# Patient Record
(demographics unavailable — no encounter records)

---

## 2019-09-05 NOTE — EKG
Test Reason : 

Blood Pressure : ***/*** mmHG

Vent. Rate : 072 BPM     Atrial Rate : 072 BPM

   P-R Int : 190 ms          QRS Dur : 090 ms

    QT Int : 370 ms       P-R-T Axes : 052 001 009 degrees

   QTc Int : 405 ms

 

Sinus rhythm with Fusion complexes

Low voltage QRS

Cannot rule out Anterior infarct , age undetermined

Abnormal ECG

Confirmed by DAY CM ([id number]) on 9/5/2019 5:29:17 PM

 

Referred By:  EMERSON           Confirmed By:DAY CM

## 2019-09-18 NOTE — OP
DATE OF PROCEDURE:  09/18/2019



PRIMARY CARE PHYSICIAN:  Dr. Avery Voss.



PREOPERATIVE DIAGNOSES:  

1. A  70-year-old male with history of benign prostatic hypertrophy.

2. Fossa navicularis, meatus, mild urethral stenosis.

3. Bulbar stricture.



POSTOPERATIVE DIAGNOSES:  

1. A  70-year-old male with history of benign prostatic hypertrophy.

2. Fossa navicularis, meatus, mild urethral stenosis.

3. Bulbar stricture.



PROCEDURES PERFORMED:  

1. Cystoscopy.

2. Meatal calibration and dilatation.

3. Direct vision internal urethrotomy of bulbar stricture.

4. UroLift implant x6.

5. An 18-Turkish [place] tip Hernandez catheter placed without guidewire assist, 10 mL

insufflated. 



ANESTHESIA:  LMA.



COMPLICATIONS:  None apparent.



DISPOSITION:  To recovery room in stable condition.



SPECIMENS:  None.



INDICATIONS FOR PROCEDURE AND HISTORY:  Mr. Locke is a  70-year-old male, well

known to me with history of prior retention, penile bulbar urethral stricture, BPH,

and history of kidney stones.  He desired to proceed with UroLift implant,

concomitant treatment of his urethral stricture.  Risks and complications and

indications were reviewed with him in detail.  Risks and complications including,

but not limited to, bleeding, pain, infection, injury to adjacent organs, urosepsis,

migration of UroLift implant resulting in incrustation requiring secondary

procedure, chronic pain, injury to adjacent structures, recurrent nature of urethral

stricture disease reviewed and he desired to proceed. 



DESCRIPTION OF PROCEDURE:  After an informed consent was signed, the patient was

taken to the operating room, placed in a dorsal lithotomy position with the genital

area prepped and draped in the usual surgical sterile fashion.  Bilateral JOSEY hose

SCDs and broad-spectrum antibiotics were provided.  A 21-Turkish cystoscope was 1st

utilized.  There was some resistance at the meatus fossa.  Therefore, we used van

Buren sounds to calibrate his meatus 16-Turkish easily and we dilated to 24-Turkish

with ease.  Subsequently, the 21-Turkish scope was able to be passed.  His urethral

mucosa did demonstrate diffuse area of fibrotic changes.  There were intermittent

areas of stricture along the proximal penile bulbar urethra.  I was able to gently

negotiate the scope to the level of the prostate into the bladder.  Bilobar

hyperplasia with moderate obstruction was noted with no median bar.  UOs were away

from the bladder neck as previously staged.  At this time, we transitioned to a

21-Turkish UroLift cystoscope with a 0-degree lens.  We re-staged the prostatic

urethra and the first implant was delivered, minimum 1.5 cm distal to the bladder

neck.  We treated the left side 1st, creating an anterior channel.  Total of 6

implants were required to create a nice open anterior channel.  At the end of the

procedure, his bladder neck was wide open, with a good anterior channel.  At the

apex, there was some bulging of the apical tissue; however, this was nonobstructing.

 Therefore, we did not treat this region.  We took intraoperative pictures,

demonstrating a good anterior channel.  I re-staged his urethral stricture.  One of

the strictures were somewhat close to the sphincter.  Therefore, I did not perform a

DVIU and it appeared to be wide caliber and dilated with the scope.  There was a 2nd

annular stricture at the bulbar urethra with persistent annular morphology.

Therefore, using an [facility] DVIU cold knife, we released the stricture anteriorly.

Subsequently, I was able to pass an 18-Turkish [place] tip without guidewire assist

to the level of the bladder, 10 mL insufflated, and he tolerated the procedure well.

 He was discharged 

with an indwelling Hernandez catheter, ciprofloxacin one p.o. b.i.d. x10 days, Azo

p.r.n., Colace p.r.n., tramadol #30 one p.o. q.6 h. p.r.n. at 50 mg, VESIcare 5 mg

one p.o. daily 

for 7 days.  Appointment with me next Thursday for catheter removal, voiding trial.

He was advised to continue his BPH medications, hold aspirin until followup

appointment. 







Job ID:  [id number]

## 2019-09-19 NOTE — HP
HISTORY OF PRESENT ILLNESS:  Mr. Locke is a 70-year-old male with history of 
BPH,

history of urinary retention, PVR of 200 mL on dual medical therapy, presents 
today

for cysto UroLift.  He previously underwent workup with cystoscopy, volume 
study.

Also found to have prior history of fossa navicularis stricture, urethral 
stricture

with concomitant treatment plan. 



PAST MEDICAL HISTORY:  Includes hypertension, dyslipidemia, morbid obesity, GERD
,

arthritis, sleep apnea, liver cyst. 



PAST SURGICAL HISTORY:  Cystoscopy, total knee replacement, gastric sleeve,

cholecystectomy, hernia repair, prior cystoscopy, DVIU. 



FAMILY HISTORY:  Positive for diabetes.



SOCIAL HISTORY:  Former smoker, quit.



ALLERGIES:  TO BIAXIN AND BACTRIM.



REVIEW OF SYSTEMS:  10-point review of systems as above, otherwise 
noncontributory.



PHYSICAL EXAMINATION:

VITAL SIGNS:  Stable. 

HEENT:  Grossly unremarkable. 

HEART:  Regular rate. 

LUNGS:  Clear. 

ABDOMEN:  Morbidly obese. 

GENITOURINARY:  Partially buried penis due to morbid obesity.  NAYAN, no gross

nodules. 

EXTREMITIES:  No significant calf tenderness.



IMPRESSION AND PLAN:  Mr. Locke is a 70-year-old male with history of

hypertension, morbid obesity, BPH, history of urethral stricture.  He desires to

discontinue his BPH medications, therefore presents for cysto, DVIU, possible

urethral 

stricture dilatation, UroLift.  Informed consent obtained and in chart.  We will

proceed with surgery as planned. 







Job ID:  [id number]



MTDD

## 2020-07-14 NOTE — ULT
RENAL ULTRASOUND:

 

HISTORY: 

Followup renal cyst.

 

COMPARISON: 

6/13/2019 exam.

 

FINDINGS: 

Real-time imaging of the right and left kidneys was performed.  The right kidney measures 12 cm and t
he left kidney measures 12 cm in length.  There is an exophytic cyst involving more the inferior aspe
ct of the left kidney.  It measures 4.8 x 5.3 cm and is not felt to be substantially changed.  There 
is an echogenic area within the mid pole region of the left kidney not definitely shadowing, therefor
e I think unlikely to represent a calculus.

 

IMPRESSION: 

1.  Stable appearance of the lower pole left renal cyst.

 

2.  The bladder was almost empty at the time of this exam.

 

POS: [facility]

## 2020-07-25 NOTE — CT
CT ABDOMEN AND PELVIS PERFORMED WITH AND WITHOUT CONTRAST ENHANCEMENT:

 

HISTORY: 

Hematuria for about a week.  Surgical history of gastric sleeve and cholecystectomy.

 

COMPARISON: 

A CT examination of 11/13/2015.  

 

FINDINGS: 

The lung bases are clear of infiltrative process. 

 

The liver, spleen, and pancreas regions appear unremarkable other than fatty change to the liver.  Ga
llbladder has been removed.  Postop changes of the stomach are noted.  

 

The right and left adrenal glands and right and left kidneys are normal in size.  A left renal cyst i
s seen.  It measures 5.4 cm. There are no renal calculi identified.  There are additionally some smal
l subcentimeter hypodensities involving both kidneys.  These are too small to characterize but have a
n appearance more suggestive of small cysts.  No obstruction.  No significant periaortic or mesenteri
c adenopathy.

 

CT OF PELVIS PERFORMED WITH AND WITHOUT CONTRAST ENHANCEMENT:

A fat-containing paraumbilical hernia is seen.  No pelvic lymphadenopathy or mass.  The bladder has a
 Hernandez catheter in place.  Postop changes in the prostate region are noted.

 

There are arthritic changes of the spine.

 

IMPRESSION: 

1.  Bilateral renal cysts.

 

2.  Fatty changes of the liver.

 

3.  Hernandez catheter in place within the bladder.

 

[place] [facility]

## 2020-07-25 NOTE — HP
CHIEF COMPLAINT:  Catheter pain, hematuria.



HISTORY OF PRESENT ILLNESS:  This is a 70-year-old male with a history of enlarged

prostate and urethral stricture, status post UroLift and DVIU in September 2019.  He

was recently seen by Dr. Fernandes on July 23rd as a work-in after calling the

office reporting that he was passing blood clots in his urine.  She performed

cystoscopy with no obvious source of bleed and placed a 20-Irish 3-way catheter.

Urinalysis at that time was unremarkable.  Late yesterday, he presented to the

emergency room in [place] with his catheter not draining.  This was irrigated

and he was discharged home.  However, it again became clotted and he is having

significant discomfort, so he presented to the emergency room here in [place]

overnight.  Given that he had been to the emergency room twice within 12 hours, I

elected to have him admitted.  He has been managed with CBI since admission and

currently, his urine is light pink on slow CBI.  He reports that his discomfort has

almost completely resolved.  He currently denies suprapubic pain, flank pain,

nausea, vomiting, fevers and chills, headaches, chest pains, or difficulty

breathing. 



PAST MEDICAL HISTORY:  Kidney stones, obesity, reflux, hyperlipidemia, hypertension.



PAST SURGICAL HISTORY:  UroLift, DVIU x2, gastric sleeve, bilateral knee replacement.



SOCIAL HISTORY:  The patient smokes cigars about twice per month.  Otherwise, no

cigarette smoking, no substance abuse, lives with his wife in [place], currently

retired. 



ALLERGIES:  CLARITHROMYCIN, SULFAMETHOXAZOLE.



CURRENT MEDICATIONS:  Reviewed, no pertinent urology medications.



REVIEW OF SYSTEMS:  12-point review of systems performed, negative except as

mentioned in my HPI. 



PHYSICAL EXAMINATION:

VITAL SIGNS:  Afebrile.  Vitals are stable. 

GENERAL:  No acute distress, conversant. 

HEENT:  Head, normocephalic and atraumatic.  Extraocular movements intact.  Sclerae

nonicteric. 

NECK:  Supple.  Trachea midline. 

LUNGS:  Breathing unlabored.  Symmetric chest expansion. 

HEART:  Regular rate and rhythm. 

ABDOMEN:  Soft, nontender, nondistended. 

:  No suprapubic or flank tenderness.  Three-way 20-Irish Hernandez catheter in good

position draining light pink urine on slow CBI, small amount of blood around the

meatus. 

EXTREMITIES:  No peripheral edema or clubbing. 

SKIN:  Warm and dry. 

PSYCHIATRIC:  Normal mood and affect. 

NEUROLOGIC:  Alert and oriented x3.



LABORATORY DATA:  Lab work reviewed.  White count 12.7, hemoglobin 14.5.  Creatinine

1.11.  Urinalysis; 25 leukocyte esterase, negative nitrite. 



ASSESSMENT AND PLAN:  On hospital day 1, gross hematuria with clot retention, benign

prostatic hypertrophy with lower urinary tract symptoms. 



We are going to arrange a CT urogram while he is here to evaluate his upper tracts

as his recent cystoscopy was unremarkable. 



Continue CBI, titrate as able. 



Given the positive leukocyte esterase, I am starting him on ceftriaxone while he is

here. 



75 minutes spent in the patient care.







Job ID:  [id number]

## 2020-07-25 NOTE — OP
DATE OF PROCEDURE:  07/25/2020



PROCEDURE:  Under sterile conditions, the drainage bag was removed from the drainage

port of the three-way catheter.  A catheter tip syringe was used to irrigate the

bladder with about 300 mL of normal saline removing a few small clots.  The drainage

bag was then reconnected and CBI re-initiated.  The patient tolerated the procedure

well. 







Job ID:  [id number]

## 2020-07-27 NOTE — DIS
DATE OF ADMISSION:  07/25/2020



DATE OF DISCHARGE:  07/26/2020



CHIEF COMPLAINT:  Catheter pain, hematuria.



HOSPITAL COURSE:  The patient presented to the emergency room here on July 25th

early in the morning reporting issues with the catheter not draining secondary to

blood clots and catheter pain.  His catheter was irrigated and started on CBI and he

was admitted under observation.  Lab work was stable in the morning and CT scan was

obtained later in the day to complete hematuria workup showing no upper tract

abnormalities.  The following morning, July 26, his urine remained clear off CBI.

He was having no discomfort.  He was tolerating oral intake and stable for discharge

home. 



DISCHARGE PHYSICAL EXAMINATION:  GENERAL:  No acute distress, conversant. 

LUNGS:  Unlabored breathing.  Symmetric chest expansion. 

HEART:  Regular rate and rhythm. 

NECK:  Supple.  Trachea midline. 

ABDOMEN:  Soft, nontender, nondistended.  No suprapubic tenderness.  No flank

tenderness.  Normal penile exam.  Hernandez catheter in good position, draining clear

urine off CBI. 

SKIN:  Warm and dry. 

NEUROLOGIC:  Alert and oriented x3. 

EXTREMITIES:  No peripheral edema or clubbing. 

PSYCHIATRIC:  Normal mood and affect.



DISCHARGE DIAGNOSES:  Hematuria, enlarged prostate with lower urinary tract

symptoms, hypertension. 



DISCHARGE PLAN:  The patient already has followup scheduled for tomorrow morning at

10:00 a.m. with Dr. Fernandes. 



DISCHARGE INSTRUCTIONS:  Light nonstressful activities, resume normal diet, continue

Cipro as previously prescribed. 





Job ID:  [id number]

## 2020-07-29 NOTE — OP
DATE OF PROCEDURE:  07/29/2020



PREOPERATIVE DIAGNOSES:  

1. A 70-year-old male with history of benign prostatic hyperplasia, status post

UroLift. 

2. History of intermittent gross hematuria.



POSTOPERATIVE DIAGNOSES:  

1. A 70-year-old male with history of benign prostatic hyperplasia, status post

UroLift. 

2. History of intermittent gross hematuria.



PROCEDURES PERFORMED:  Cystoscopy, laser vaporization of the prostate, 
retrieval of

UroLift implant x1.,  Meatal calibration dilatation with Robert sounds



ANESTHESIA:  General.



COMPLICATIONS:  None apparent.



DISPOSITION:  To recovery room in stable condition.



INDICATIONS FOR PROCEDURE AND HISTORY:  Mr. Locke is a pleasant 70-year-old 
male

with history of BPH, he was deemed a good surgical candidate for UroLift based 
on

his prostate volume and cystoscopy and underwent UroLift implant uneventfully 
back

in September 2019.  A total of 6 implants were placed, he also underwent meatal

dilatation concomitantly at that time.  Implants demonstrated good anterior 
channel

and he is very satisfied with his urinary caliber and flow.  I recently saw him 
in

my office as he had intermittent gross hematuria.  As there was persistent

hematuria, a local cystoscopy was performed demonstrating no exposed implants 
per

se.  No evidence of bladder neck migration of the implant was noted.  No active

bleeding from the bladder was noted.  There was mild prostatic oozing 
nonspecific.

CT of the abdomen and pelvis demonstrated no acute pathology of concern.  He

presents today for cystoscopy, transurethral resection of prostate, fulguration 
of

prostatic urethra.  Risks and complications of the procedure were discussed 
with him

in detail including, but not limited to, bleeding, pain, infection, injury to

adjacent organs, bladder neck contractures, stricture formation.  Possible 
secondary

procedure was discussed with the patient in detail as there was no gross obvious

evidence of active oozing on local cystoscopy. 



DESCRIPTION OF PROCEDURE:  After an informed consent was signed, the patient was

taken to the operating room, placed in a dorsal lithotomy position with the 
genital

area prepped and draped in the usual surgical sterile fashion.  A 21-Malawian

cystoscope was utilized for cystoscopy.  There was some resistance passing the

21-Malawian cystoscope.  He does have previous history of meatal stenosis.  We

calibrated his meatus to about 18-Malawian and subsequently dilated to 26-Malawian

without difficulty.  Subsequently, the 21-Malawian cystoscope passed without

difficulty.  At this time, we staged the prostatic urethra.  We parked at the 
area

of verumontanum to see if there was any oozing.  I did not see any active oozing

from the prostatic urethra.  We gently passed the scope into the prostatic 
urethra

demonstrating dimpling effect from previous UroLift.  I did not see any obvious

exposed UroLift implant.  Anterior channel as noted was adequate and he had good

channel noted.  Bladder was entered, which demonstrated no evidence of bladder

stones.  Bilateral clear efflux of urine was noted.  As he recently had a Hernandez

catheter, there was some inflammation of the trigone consistent with chronic 
Hernandez

catheter.  At this time, I transitioned to a 26-Malawian continuous sheet, an 
Olympus

resectoscope with a visual obturator.  We initially tried a prostate loop, 
however,

as we were resecting the left lateral lobe to expose implants if any amendable, 
we

did encroach an implant, and because of the conduction, this did cause a  
defect  in

our prostate loop.  Therefore, we transitioned to the PlasmaButton.  Using the

appropriate setting for prostate, we gently ablated and fulgurated the prostatic

urethra.  We did expose one implant, which was easily retrieved intact.  I did 
not

further pursue obtaining a deep ablation because he has adequate anterior 
channel

created for good flow.  There did appear to be some dimpling effect on the left

lateral where we retrieved the implant, and this may be the site that was

intermittently oozing.  There were some nonobstructing  mucosal band  of the 
prostatic urethra

in this region.  After the UroLift implant was removed from this junction, 
there was

some bleeding from the site that we retrieved as anticipated.  Using 
vaporization button we cauterized and obtained good hemostasis as much as we 
could.  I did not

aggressively jimena the defect as the implants were placed outside the prostate

capsule.  We visualized his prostatic urethra at the verumontanum after we 
performed

our ablation and our fulguration of the previous UroLift site.  I do not see any

further active oozing of concern.  I did perform a laser vaporization of the

prostate at the anterior aspect of the prostate and near  apex 

area that previous implants were not placed to avoid exposing implants that are

already in good position.  Good hemostasis was noted and a 22- 

Malawian 3-way Hernandez catheter was placed without difficulty and 30 mL insufflated 
and

CBI tubing demonstrated clear output.  I will monitor the patient overnight with

23-hour observation with CBI.  Anticipate the patient will be discharged with

indwelling Hernandez catheter for few weeks to allow the prostatic urethra to heal. 







Job ID:  [id number]



MTDD

## 2020-07-30 NOTE — DIS
DATE OF ADMISSION:  07/29/2020



DATE OF DISCHARGE:  07/30/2020



DISPOSITION:  Home.



CONDITION:  Stable.



DRAINS:  Hernandez catheter to gravity leg bag.



DISCHARGE MEDICATIONS:  

1. Azo over-the-counter p.r.n. for dysuria.

2. Ciprofloxacin 500 mg one p.o. b.i.d. for 10 days.



DISCHARGE INSTRUCTIONS:  No heavy lifting or strenuous activity.



BRIEF HOSPITAL COURSE:  Mr. Locke is a pleasant 70-year-old male, whom I had 
been

following with history of kidney stone and BPH.  The patient with history of

elevated PVR, underwent UroLift uneventfully last year.  He re-presented with

intermittent gross hematuria of unclear etiology.  Cystoscopy demonstrated 
likely

prostatic source as there was inflammation in the prostatic urethra, however, no

acute bleeding was noted on local cystoscopy.  There was no exposed urethral 
implant

on cystoscopy.  Due to recurrent gross hematuria, underwent staging CT, which 
was

negative.  Cytology negative and underwent laser vaporization of the prostatic

urethra, removal of one implant that may have been the culprit for intermittent

hematuria as there was some inflammatory component associated with it.  He 
tolerated

the procedure well, his CBI has been held this morning and urine output is 
clear.

Expectations of intermittent hematuria due to recent surgery has been reviewed.
  I

will monitor his urine output, if it is stable with patient ambulating, we would

discharge home.  The patient is to return to clinic next week for Hernandez

catheter removal.  Addendum, patient continues to have clear deandra urine 
discharge with catheter uneventful







Job ID:  [id number]



MTDD